# Patient Record
Sex: MALE | Race: WHITE | NOT HISPANIC OR LATINO
[De-identification: names, ages, dates, MRNs, and addresses within clinical notes are randomized per-mention and may not be internally consistent; named-entity substitution may affect disease eponyms.]

---

## 2019-03-01 ENCOUNTER — OTHER (OUTPATIENT)
Age: 39
End: 2019-03-01

## 2019-03-01 DIAGNOSIS — M25.569 PAIN IN UNSPECIFIED KNEE: ICD-10-CM

## 2019-03-11 ENCOUNTER — APPOINTMENT (OUTPATIENT)
Dept: ORTHOPEDIC SURGERY | Facility: CLINIC | Age: 39
End: 2019-03-11

## 2021-04-14 ENCOUNTER — APPOINTMENT (OUTPATIENT)
Dept: GASTROENTEROLOGY | Facility: CLINIC | Age: 41
End: 2021-04-14

## 2022-11-03 ENCOUNTER — APPOINTMENT (OUTPATIENT)
Dept: ORTHOPEDIC SURGERY | Facility: CLINIC | Age: 42
End: 2022-11-03

## 2022-11-03 DIAGNOSIS — S33.5XXA SPRAIN OF LIGAMENTS OF LUMBAR SPINE, INITIAL ENCOUNTER: ICD-10-CM

## 2022-11-03 DIAGNOSIS — Z78.9 OTHER SPECIFIED HEALTH STATUS: ICD-10-CM

## 2022-11-03 DIAGNOSIS — M54.16 RADICULOPATHY, LUMBAR REGION: ICD-10-CM

## 2022-11-03 PROCEDURE — 99203 OFFICE O/P NEW LOW 30 MIN: CPT

## 2022-11-03 PROCEDURE — 72170 X-RAY EXAM OF PELVIS: CPT

## 2022-11-03 PROCEDURE — 72100 X-RAY EXAM L-S SPINE 2/3 VWS: CPT

## 2022-11-03 NOTE — HISTORY OF PRESENT ILLNESS
[Lower back] : lower back [9] : 9 [7] : 7 [Stabbing] : stabbing [Constant] : constant [Leisure] : leisure [Work] : work [Sleep] : sleep [Meds] : meds [Ice] : ice [Heat] : heat [Sitting] : sitting [Exercising] : exercising [Stairs] : stairs [Lying in bed] : lying in bed [de-identified] : 11-3-22- 2+ weeks ago was bending over while getting dressed felt his back pop. He has been ambulating with a limp since. Pain is mostly left sided. He saw his pcp who put him on medrol pack, muscle relaxer and percocet. Pack was completed without help he followed up with the pcp who added diclofenac and advised ortho consult.\par \par He works a desk position \par \par denies contributory pmh [] : no [FreeTextEntry5] : lower back pain, pt states he bent down to pick something up two weeks ago and he got stuck. pt states he was on the floor for 45 min and stayed in bed the rest of that day. pt states he saw PCP on 10/28/2022 and was given prednisone, naproxen and diclofenac. Pt spoke with PCP on monday because pain was not getting better, he was given a prescription for percocet and referred here.

## 2022-11-03 NOTE — ASSESSMENT
[FreeTextEntry1] : continue with the meds. will start him on therapy\par He has failed other physician directed care will get mri to evaluate for acute disc that may require lesi vs surgical intervention

## 2022-11-03 NOTE — PHYSICAL EXAM
[] : patient ambulates without assistive device [No bony abnormalities] : No bony abnormalities [AP] : anteroposterior [There are no fractures, subluxations or dislocations. No significant abnormalities are seen] : There are no fractures, subluxations or dislocations. No significant abnormalities are seen

## 2022-11-03 NOTE — IMAGING
[Straightening consistent with spasm] : Straightening consistent with spasm [FreeTextEntry1] : posterior facet changes at 5-1

## 2022-11-06 ENCOUNTER — FORM ENCOUNTER (OUTPATIENT)
Age: 42
End: 2022-11-06

## 2022-11-07 ENCOUNTER — APPOINTMENT (OUTPATIENT)
Dept: MRI IMAGING | Facility: CLINIC | Age: 42
End: 2022-11-07

## 2022-11-07 PROCEDURE — 72148 MRI LUMBAR SPINE W/O DYE: CPT

## 2022-11-11 ENCOUNTER — APPOINTMENT (OUTPATIENT)
Dept: ORTHOPEDIC SURGERY | Facility: CLINIC | Age: 42
End: 2022-11-11

## 2024-12-05 ENCOUNTER — APPOINTMENT (OUTPATIENT)
Dept: SURGERY | Facility: CLINIC | Age: 44
End: 2024-12-05